# Patient Record
Sex: MALE | Employment: FULL TIME | URBAN - METROPOLITAN AREA
[De-identification: names, ages, dates, MRNs, and addresses within clinical notes are randomized per-mention and may not be internally consistent; named-entity substitution may affect disease eponyms.]

---
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
## 2019-10-09 ENCOUNTER — OFFICE VISIT (OUTPATIENT)
Dept: URGENT CARE | Facility: URGENT CARE | Age: 19
End: 2019-10-09
Payer: COMMERCIAL

## 2019-10-09 VITALS
DIASTOLIC BLOOD PRESSURE: 78 MMHG | SYSTOLIC BLOOD PRESSURE: 129 MMHG | HEART RATE: 90 BPM | WEIGHT: 284.8 LBS | TEMPERATURE: 98.5 F | OXYGEN SATURATION: 97 %

## 2019-10-09 DIAGNOSIS — M22.41 CHONDROMALACIA OF PATELLA, RIGHT: Primary | ICD-10-CM

## 2019-10-09 PROCEDURE — 99213 OFFICE O/P EST LOW 20 MIN: CPT | Performed by: INTERNAL MEDICINE

## 2019-10-09 RX ORDER — NAPROXEN 500 MG/1
500 TABLET ORAL 2 TIMES DAILY WITH MEALS
Qty: 60 TABLET | Refills: 1 | Status: SHIPPED | OUTPATIENT
Start: 2019-10-09

## 2019-10-10 NOTE — PROGRESS NOTES
"SUBJECTIVE:  Jerome Zamora, a 19 year old male, presents for evaluation of knee pain.  He notes the onset of right knee pain earlier today while he was   \"casually working\".  He then crouched/squatted to pick something up when the right knee gave him a shooting pain.  Now both knees are generally sore.  He denies any extension block.  He is going up and down stairs but has more pain going up.      OBJECTIVE:  /78 (BP Location: Right arm, Patient Position: Sitting, Cuff Size: Adult Large)   Pulse 90   Temp 98.5  F (36.9  C)   Wt 129.2 kg (284 lb 12.8 oz)   SpO2 97%   GEN: obese male  RIGHT KNEE: tender over the patella; nontender at the MCL, LCL, medial and lateral joint line; negative Makenna's, negative anterior drawer, mild patellar apprehension  LEFT KNEE: tender over the patella; nontender at the MCL, LCL, medial and lateral joint line; negative Makenna's, negative anterior drawer, negative patellar apprehension    ASSESSMENT/PLAN:    ICD-10-CM    1. 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       Kirill Philip MD   "

## 2019-10-10 NOTE — PATIENT INSTRUCTIONS
Patient Education     Common Kneecap (Patella) Problems  If the kneecap is  off track  even slightly (a tracking problem), it can cause uneven pressure on the back of the kneecap. This can cause pain and difficulty with movements, such as walking and going down stairs. Below are some common causes of kneecap pain.    Cartilage damage  Sometimes the cartilage on the back of the kneecap or in the groove of the thighbone is damaged. Damaged cartilage can t spread pressure evenly. Uneven pressure wears down the cartilage even further.   Dislocation  Sometimes a muscle or ligament in the knee is pulled the wrong way. Or the kneecap may be pushed too hard. Then the kneecap may move partly out of the groove (subluxation). It may even move completely out (dislocation).     Patellar tendinitis  Patellar tendinitis ( jumper s knee ) happens when the quadriceps muscles are overused or tight. During movement, the patellar tendon absorbs more shock than usual. The tendon becomes irritated or damaged.   Plica syndrome  Plica bands are tissue fibers that some people have near the kneecap. They usually cause no problems. But sometimes they can become irritated or inflamed.   Date Last Reviewed: 5/1/2018 2000-2018 [employer]. [address] [place] [address], [place] [address]. All rights reserved. This information is not intended as a substitute for professional medical care. Always follow your healthcare professional's instructions.

## 2020-02-17 ENCOUNTER — HEALTH MAINTENANCE LETTER (OUTPATIENT)
Age: 20
End: 2020-02-17

## 2020-08-10 ENCOUNTER — OFFICE VISIT (OUTPATIENT)
Dept: INTERNAL MEDICINE | Facility: CLINIC | Age: 20
End: 2020-08-10
Payer: COMMERCIAL

## 2020-08-10 VITALS
BODY MASS INDEX: 44.9 KG/M2 | HEART RATE: 79 BPM | DIASTOLIC BLOOD PRESSURE: 80 MMHG | TEMPERATURE: 98.3 F | RESPIRATION RATE: 16 BRPM | WEIGHT: 286.1 LBS | OXYGEN SATURATION: 98 % | HEIGHT: 67 IN | SYSTOLIC BLOOD PRESSURE: 115 MMHG

## 2020-08-10 DIAGNOSIS — E66.01 MORBID OBESITY (H): ICD-10-CM

## 2020-08-10 DIAGNOSIS — F33.1 MODERATE EPISODE OF RECURRENT MAJOR DEPRESSIVE DISORDER (H): Primary | ICD-10-CM

## 2020-08-10 PROCEDURE — 96127 BRIEF EMOTIONAL/BEHAV ASSMT: CPT | Performed by: INTERNAL MEDICINE

## 2020-08-10 PROCEDURE — 99213 OFFICE O/P EST LOW 20 MIN: CPT | Performed by: INTERNAL MEDICINE

## 2020-08-10 RX ORDER — MIRTAZAPINE 15 MG/1
15 TABLET, FILM COATED ORAL AT BEDTIME
COMMUNITY
Start: 2020-07-30 | End: 2020-08-18

## 2020-08-10 RX ORDER — HYDROXYZINE PAMOATE 50 MG/1
50 CAPSULE ORAL 2 TIMES DAILY
COMMUNITY
Start: 2020-07-30

## 2020-08-10 ASSESSMENT — ANXIETY QUESTIONNAIRES
IF YOU CHECKED OFF ANY PROBLEMS ON THIS QUESTIONNAIRE, HOW DIFFICULT HAVE THESE PROBLEMS MADE IT FOR YOU TO DO YOUR WORK, TAKE CARE OF THINGS AT HOME, OR GET ALONG WITH OTHER PEOPLE: SOMEWHAT DIFFICULT
3. WORRYING TOO MUCH ABOUT DIFFERENT THINGS: MORE THAN HALF THE DAYS
7. FEELING AFRAID AS IF SOMETHING AWFUL MIGHT HAPPEN: NEARLY EVERY DAY
GAD7 TOTAL SCORE: 14
1. FEELING NERVOUS, ANXIOUS, OR ON EDGE: MORE THAN HALF THE DAYS
5. BEING SO RESTLESS THAT IT IS HARD TO SIT STILL: SEVERAL DAYS
2. NOT BEING ABLE TO STOP OR CONTROL WORRYING: MORE THAN HALF THE DAYS
6. BECOMING EASILY ANNOYED OR IRRITABLE: NEARLY EVERY DAY

## 2020-08-10 ASSESSMENT — MIFFLIN-ST. JEOR: SCORE: 2261.37

## 2020-08-10 ASSESSMENT — PATIENT HEALTH QUESTIONNAIRE - PHQ9
5. POOR APPETITE OR OVEREATING: SEVERAL DAYS
SUM OF ALL RESPONSES TO PHQ QUESTIONS 1-9: 14

## 2020-08-10 NOTE — PATIENT INSTRUCTIONS
"These were instructions from your hospitalization:  \"You have been referred to the [facility]. Please call them at [phone] if you have not heard from them in the next few days after you are discharged.\"    Please contact the above number to try to arrange an appointment.    "

## 2020-08-10 NOTE — PROGRESS NOTES
"Subjective     Jerome Zamora is a 20 year old male who presents to clinic today for the following health issues:    HPI         Hospital Follow-up Visit:    Hospital/Nursing Home/IP Rehab Facility: [facility]  Date of Admission: 07/27/2020  Date of Discharge: 07/30/2020  Reason(s) for Admission: Suicidal ideation, PTSD      Was your hospitalization related to COVID-19? No   Problems taking medications regularly:  None  Medication changes since discharge: hydroxyzine, mirtazapine, and naproxen  Problems adhering to non-medication therapy:  None    Summary of hospitalization:  CareEverywhere information obtained and reviewed  Diagnostic Tests/Treatments reviewed.  Follow up needed: Psych follow-up  Other Healthcare Providers Involved in Patient s Care:         None  Update since discharge: improved.       Post Discharge Medication Reconciliation: discharge medications reconciled, continue medications without change.  Plan of care communicated with patient                Was given referral to intensive outpatient program which he has not made appointment with as of yet.      Reviewed and updated as needed this visit by Provider         Review of Systems   Constitutional, HEENT, cardiovascular, pulmonary, GI, , musculoskeletal, neuro, skin, endocrine and psych systems are negative, except as otherwise noted.      Objective    /80 (BP Location: Left arm, Patient Position: Chair, Cuff Size: Adult Large)   Pulse 79   Temp 98.3  F (36.8  C) (Oral)   Resp 16   Ht 1.702 m (5' 7\")   Wt 129.8 kg (286 lb 1.6 oz)   SpO2 98%   BMI 44.81 kg/m    Body mass index is 44.81 kg/m .  Physical Exam   GENERAL: healthy, alert and no distress  NECK: no adenopathy, no asymmetry, masses, or scars and thyroid normal to palpation  RESP: lungs clear to auscultation - no rales, rhonchi or wheezes  CV: regular rate and rhythm, normal S1 S2, no S3 or S4, no murmur, click or rub, no peripheral edema and peripheral pulses " "strong  ABDOMEN: soft, nontender, no hepatosplenomegaly, no masses and bowel sounds normal  MS: no gross musculoskeletal defects noted, no edema            Assessment & Plan     1. Moderate episode of recurrent major depressive disorder (H)  Needs to follow-up with intensive outpatient program that he has been referred to.  Given contact information.    2. Morbid obesity (H)  Reiterated the importance of dietary modification and weight loss     BMI:   Estimated body mass index is 44.81 kg/m  as calculated from the following:    Height as of this encounter: 1.702 m (5' 7\").    Weight as of this encounter: 129.8 kg (286 lb 1.6 oz).   Weight management plan: Discussed healthy diet and exercise guidelines        See Patient Instructions    No follow-ups on file.    Graeme Bardales MD  [facility]    "

## 2020-08-11 ASSESSMENT — ANXIETY QUESTIONNAIRES: GAD7 TOTAL SCORE: 14

## 2020-08-18 ENCOUNTER — VIRTUAL VISIT (OUTPATIENT)
Dept: INTERNAL MEDICINE | Facility: CLINIC | Age: 20
End: 2020-08-18
Payer: COMMERCIAL

## 2020-08-18 DIAGNOSIS — F33.2 SEVERE EPISODE OF RECURRENT MAJOR DEPRESSIVE DISORDER, WITHOUT PSYCHOTIC FEATURES (H): Primary | ICD-10-CM

## 2020-08-18 PROCEDURE — 99214 OFFICE O/P EST MOD 30 MIN: CPT | Mod: 95 | Performed by: INTERNAL MEDICINE

## 2020-08-18 RX ORDER — MIRTAZAPINE 30 MG/1
30 TABLET, FILM COATED ORAL AT BEDTIME
Qty: 30 TABLET | Refills: 0 | Status: SHIPPED | OUTPATIENT
Start: 2020-08-18

## 2020-08-18 NOTE — PROGRESS NOTES
"Jerome Zamora is a 20 year old male who is being evaluated via a billable video visit.      The patient has been notified of following:     \"This video visit will be conducted via a call between you and your physician/provider. We have found that certain health care needs can be provided without the need for an in-person physical exam.  This service lets us provide the care you need with a video conversation.  If a prescription is necessary we can send it directly to your pharmacy.  If lab work is needed we can place an order for that and you can then stop by our lab to have the test done at a later time.    Video visits are billed at different rates depending on your insurance coverage.  Please reach out to your insurance provider with any questions.    If during the course of the call the physician/provider feels a video visit is not appropriate, you will not be charged for this service.\"    Patient has given verbal consent for Video visit? Yes  How would you like to obtain your AVS? MyChart  If you are dropped from the video visit, the video invite should be resent to: Text to cell phone: [phone]  Will anyone else be joining your video visit? No    Subjective     Jerome Zamora is a 20 year old male who presents today via video visit for the following health issues:    HPI    Patient was hospitalized at [facility] this summer with severe depression without psychosis and anxiety.  Question of PTSD also noted.  Discharged by [facility] recommended follow-up with primary care for intensive outpatient program.  He that time had no follow-up mental health set up at discharge but was referred here.  Seen once by Dr. Bardales at which time patient was given directions to schedule his IOP at [facility].  He states he called them but was told he had no referral and may would not schedule until the referral was processed.  I believe that we were under the impression that the referral was placed by " "[facility] as he was directed there by them.  Since then he has not seen anybody in follow-up.  He states that the mirtazapine seems to help him sleep but during the day he does get angry and anxious at times with the hydroxyzine being only partially effective.  He does not recall any of the crisis numbers provided to him at his discharge from [facility] has been in contact with any mental health therapist or providers.     Video Start Time: 319    Reviewed and updated as needed this visit by Provider         Review of Systems   Constitutional, HEENT, cardiovascular, pulmonary, gi and gu systems are negative, except as otherwise noted.      Objective           Vitals:  No vitals were obtained today due to virtual visit.    Physical Exam     GENERAL: alert, no distress and over weight  PSYCH: mentation appears normal and appearance disheveled    none         Assessment & Plan     1. Severe episode of recurrent major depressive disorder, without psychotic features (H)  Placed referral for day program through [facility] he also should have psychiatric follow-up.  We recommend involvement of mental health specialist.  Increase his mirtazapine to 30 mg to help with his symptoms at present  Crisis numbers provided on [facility]  - MENTAL HEALTH REFERRAL  - Adult; Psychiatry, Outpatient Treatment; Day Treatment/Dual Disorder/Partial Hospitalization Program - Assess & Treat; [facility]: Day Treatment (MH / Dual / 55+) [phone]; We will contact you to schedule the appointment or...  - mirtazapine (REMERON) 30 MG tablet; Take 1 tablet (30 mg) by mouth At Bedtime  Dispense: 30 tablet; Refill: 0     BMI:   Estimated body mass index is 44.81 kg/m  as calculated from the following:    Height as of 8/10/20: 1.702 m (5' 7\").    Weight as of 8/10/20: 129.8 kg (286 lb 1.6 oz).     See Patient Instructions    No follow-ups on file.    Edmundo Hilario MD  [facility]      Video-Visit Details    Type of " service:  Video Visit    Video End Time:339    Originating Location (pt. Location): Home    Distant Location (provider location):  [facility]     Platform used for Video Visit: Coleman

## 2020-08-18 NOTE — PATIENT INSTRUCTIONS
1. [employer]  [address], [address], [place], [place] [id number] [place] [address] ([employer]) improves the lives of children and adults with mental illnesses and their families by providing free classes on mental illnesses and support groups for adults with mental illnesses, parents and family members. For more information:  Phone: [phone]  Toll free: 2-034-USVQ-ePatientFinder  Website: www.[facility]org    2. Online go to: www.[place]Help.info    Crisis Line Numbers    [facility] ([facility]) [phone]

## 2020-09-28 PROBLEM — E66.01 MORBID OBESITY (H): Status: ACTIVE | Noted: 2020-09-28

## 2020-11-29 ENCOUNTER — HEALTH MAINTENANCE LETTER (OUTPATIENT)
Age: 20
End: 2020-11-29

## 2021-04-06 ENCOUNTER — OFFICE VISIT (OUTPATIENT)
Dept: URGENT CARE | Facility: URGENT CARE | Age: 21
End: 2021-04-06
Payer: COMMERCIAL

## 2021-04-06 VITALS
BODY MASS INDEX: 37.59 KG/M2 | DIASTOLIC BLOOD PRESSURE: 66 MMHG | RESPIRATION RATE: 20 BRPM | TEMPERATURE: 98.7 F | OXYGEN SATURATION: 96 % | WEIGHT: 240 LBS | SYSTOLIC BLOOD PRESSURE: 126 MMHG | HEART RATE: 77 BPM

## 2021-04-06 DIAGNOSIS — H66.001 ACUTE SUPPURATIVE OTITIS MEDIA OF RIGHT EAR WITHOUT SPONTANEOUS RUPTURE OF TYMPANIC MEMBRANE, RECURRENCE NOT SPECIFIED: Primary | ICD-10-CM

## 2021-04-06 DIAGNOSIS — H60.391 INFECTIVE OTITIS EXTERNA, RIGHT: ICD-10-CM

## 2021-04-06 PROCEDURE — 99213 OFFICE O/P EST LOW 20 MIN: CPT | Performed by: FAMILY MEDICINE

## 2021-04-06 RX ORDER — AMOXICILLIN 875 MG
875 TABLET ORAL 2 TIMES DAILY
Qty: 20 TABLET | Refills: 0 | Status: SHIPPED | OUTPATIENT
Start: 2021-04-06 | End: 2021-04-16

## 2021-04-06 RX ORDER — NEOMYCIN SULFATE, POLYMYXIN B SULFATE AND HYDROCORTISONE 10; 3.5; 1 MG/ML; MG/ML; [USP'U]/ML
3 SUSPENSION/ DROPS AURICULAR (OTIC) 4 TIMES DAILY
Qty: 6 ML | Refills: 0 | Status: SHIPPED | OUTPATIENT
Start: 2021-04-06 | End: 2021-04-16

## 2021-04-08 ENCOUNTER — APPOINTMENT (OUTPATIENT)
Dept: CT IMAGING | Facility: CLINIC | Age: 21
End: 2021-04-08
Attending: EMERGENCY MEDICINE
Payer: COMMERCIAL

## 2021-04-08 ENCOUNTER — OFFICE VISIT (OUTPATIENT)
Dept: URGENT CARE | Facility: URGENT CARE | Age: 21
End: 2021-04-08
Payer: COMMERCIAL

## 2021-04-08 ENCOUNTER — HOSPITAL ENCOUNTER (EMERGENCY)
Facility: CLINIC | Age: 21
Discharge: HOME | End: 2021-04-08
Attending: EMERGENCY MEDICINE | Admitting: EMERGENCY MEDICINE
Payer: COMMERCIAL

## 2021-04-08 VITALS
OXYGEN SATURATION: 100 % | HEART RATE: 69 BPM | RESPIRATION RATE: 16 BRPM | SYSTOLIC BLOOD PRESSURE: 139 MMHG | TEMPERATURE: 98.6 F | DIASTOLIC BLOOD PRESSURE: 80 MMHG | BODY MASS INDEX: 37.59 KG/M2 | WEIGHT: 240 LBS

## 2021-04-08 VITALS
HEIGHT: 71 IN | RESPIRATION RATE: 11 BRPM | BODY MASS INDEX: 33.6 KG/M2 | SYSTOLIC BLOOD PRESSURE: 139 MMHG | TEMPERATURE: 98.4 F | HEART RATE: 65 BPM | WEIGHT: 240 LBS | DIASTOLIC BLOOD PRESSURE: 94 MMHG | OXYGEN SATURATION: 98 %

## 2021-04-08 DIAGNOSIS — H61.23 BILATERAL IMPACTED CERUMEN: ICD-10-CM

## 2021-04-08 DIAGNOSIS — H60.391 INFECTIVE OTITIS EXTERNA, RIGHT: ICD-10-CM

## 2021-04-08 DIAGNOSIS — H92.01 RIGHT EAR PAIN: ICD-10-CM

## 2021-04-08 DIAGNOSIS — R22.1 SWOLLEN NECK: Primary | ICD-10-CM

## 2021-04-08 DIAGNOSIS — H60.501 ACUTE OTITIS EXTERNA OF RIGHT EAR, UNSPECIFIED TYPE: ICD-10-CM

## 2021-04-08 LAB
ANION GAP SERPL CALCULATED.3IONS-SCNC: 6 MMOL/L (ref 3–14)
BASOPHILS # BLD AUTO: 0 10E9/L (ref 0–0.2)
BASOPHILS NFR BLD AUTO: 0.2 %
BUN SERPL-MCNC: 15 MG/DL (ref 7–30)
CALCIUM SERPL-MCNC: 9.5 MG/DL (ref 8.5–10.1)
CHLORIDE SERPL-SCNC: 107 MMOL/L (ref 94–109)
CO2 SERPL-SCNC: 22 MMOL/L (ref 20–32)
CREAT SERPL-MCNC: 0.94 MG/DL (ref 0.66–1.25)
CRP SERPL-MCNC: 26.4 MG/L (ref 0–8)
DIFFERENTIAL METHOD BLD: ABNORMAL
EOSINOPHIL # BLD AUTO: 0.3 10E9/L (ref 0–0.7)
EOSINOPHIL NFR BLD AUTO: 2.6 %
ERYTHROCYTE [DISTWIDTH] IN BLOOD BY AUTOMATED COUNT: 13.8 % (ref 10–15)
ERYTHROCYTE [SEDIMENTATION RATE] IN BLOOD BY WESTERGREN METHOD: 8 MM/H (ref 0–15)
GFR SERPL CREATININE-BSD FRML MDRD: >90 ML/MIN/{1.73_M2}
GLUCOSE SERPL-MCNC: 88 MG/DL (ref 70–99)
HCT VFR BLD AUTO: 48.8 % (ref 40–53)
HGB BLD-MCNC: 15.7 G/DL (ref 13.3–17.7)
IMM GRANULOCYTES # BLD: 0.1 10E9/L (ref 0–0.4)
IMM GRANULOCYTES NFR BLD: 0.4 %
LYMPHOCYTES # BLD AUTO: 2.4 10E9/L (ref 0.8–5.3)
LYMPHOCYTES NFR BLD AUTO: 19.7 %
MCH RBC QN AUTO: 26.4 PG (ref 26.5–33)
MCHC RBC AUTO-ENTMCNC: 32.2 G/DL (ref 31.5–36.5)
MCV RBC AUTO: 82 FL (ref 78–100)
MONOCYTES # BLD AUTO: 0.8 10E9/L (ref 0–1.3)
MONOCYTES NFR BLD AUTO: 6.4 %
NEUTROPHILS # BLD AUTO: 8.5 10E9/L (ref 1.6–8.3)
NEUTROPHILS NFR BLD AUTO: 70.7 %
NRBC # BLD AUTO: 0 10*3/UL
NRBC BLD AUTO-RTO: 0 /100
PLATELET # BLD AUTO: 293 10E9/L (ref 150–450)
POTASSIUM SERPL-SCNC: 3.8 MMOL/L (ref 3.4–5.3)
RBC # BLD AUTO: 5.94 10E12/L (ref 4.4–5.9)
SODIUM SERPL-SCNC: 135 MMOL/L (ref 133–144)
WBC # BLD AUTO: 12.1 10E9/L (ref 4–11)

## 2021-04-08 PROCEDURE — 250N000009 HC RX 250: Performed by: EMERGENCY MEDICINE

## 2021-04-08 PROCEDURE — 96375 TX/PRO/DX INJ NEW DRUG ADDON: CPT

## 2021-04-08 PROCEDURE — 250N000013 HC RX MED GY IP 250 OP 250 PS 637: Performed by: EMERGENCY MEDICINE

## 2021-04-08 PROCEDURE — 85025 COMPLETE CBC W/AUTO DIFF WBC: CPT | Performed by: EMERGENCY MEDICINE

## 2021-04-08 PROCEDURE — 99285 EMERGENCY DEPT VISIT HI MDM: CPT | Mod: 25

## 2021-04-08 PROCEDURE — 99215 OFFICE O/P EST HI 40 MIN: CPT | Performed by: NURSE PRACTITIONER

## 2021-04-08 PROCEDURE — 85652 RBC SED RATE AUTOMATED: CPT | Performed by: EMERGENCY MEDICINE

## 2021-04-08 PROCEDURE — 69210 REMOVE IMPACTED EAR WAX UNI: CPT | Mod: RT,LT

## 2021-04-08 PROCEDURE — 80048 BASIC METABOLIC PNL TOTAL CA: CPT | Performed by: EMERGENCY MEDICINE

## 2021-04-08 PROCEDURE — 86140 C-REACTIVE PROTEIN: CPT | Performed by: EMERGENCY MEDICINE

## 2021-04-08 PROCEDURE — 96374 THER/PROPH/DIAG INJ IV PUSH: CPT | Mod: 59

## 2021-04-08 PROCEDURE — 250N000011 HC RX IP 250 OP 636: Performed by: EMERGENCY MEDICINE

## 2021-04-08 PROCEDURE — 70481 CT ORBIT/EAR/FOSSA W/DYE: CPT

## 2021-04-08 RX ORDER — IOPAMIDOL 755 MG/ML
50 INJECTION, SOLUTION INTRAVASCULAR ONCE
Status: COMPLETED | OUTPATIENT
Start: 2021-04-08 | End: 2021-04-08

## 2021-04-08 RX ORDER — OXYCODONE AND ACETAMINOPHEN 5; 325 MG/1; MG/1
1-2 TABLET ORAL EVERY 6 HOURS PRN
Qty: 6 TABLET | Refills: 0 | Status: SHIPPED | OUTPATIENT
Start: 2021-04-08

## 2021-04-08 RX ORDER — OXYCODONE HYDROCHLORIDE 5 MG/1
10 TABLET ORAL ONCE
Status: COMPLETED | OUTPATIENT
Start: 2021-04-08 | End: 2021-04-08

## 2021-04-08 RX ORDER — MORPHINE SULFATE 4 MG/ML
4 INJECTION, SOLUTION INTRAMUSCULAR; INTRAVENOUS ONCE
Status: COMPLETED | OUTPATIENT
Start: 2021-04-08 | End: 2021-04-08

## 2021-04-08 RX ORDER — CIPROFLOXACIN AND DEXAMETHASONE 3; 1 MG/ML; MG/ML
4 SUSPENSION/ DROPS AURICULAR (OTIC) ONCE
Status: DISCONTINUED | OUTPATIENT
Start: 2021-04-08 | End: 2021-04-08

## 2021-04-08 RX ORDER — CIPROFLOXACIN 500 MG/1
500 TABLET, FILM COATED ORAL 2 TIMES DAILY
Qty: 13 TABLET | Refills: 0 | Status: SHIPPED | OUTPATIENT
Start: 2021-04-08 | End: 2021-04-15

## 2021-04-08 RX ORDER — OFLOXACIN 3 MG/ML
10 SOLUTION AURICULAR (OTIC) DAILY
Qty: 5 ML | Refills: 0 | Status: SHIPPED | OUTPATIENT
Start: 2021-04-08 | End: 2021-04-15

## 2021-04-08 RX ORDER — CIPROFLOXACIN 500 MG/1
500 TABLET, FILM COATED ORAL ONCE
Status: COMPLETED | OUTPATIENT
Start: 2021-04-08 | End: 2021-04-08

## 2021-04-08 RX ORDER — KETOROLAC TROMETHAMINE 15 MG/ML
15 INJECTION, SOLUTION INTRAMUSCULAR; INTRAVENOUS ONCE
Status: COMPLETED | OUTPATIENT
Start: 2021-04-08 | End: 2021-04-08

## 2021-04-08 RX ORDER — ACETAMINOPHEN 500 MG
1000 TABLET ORAL ONCE
Status: COMPLETED | OUTPATIENT
Start: 2021-04-08 | End: 2021-04-08

## 2021-04-08 RX ADMIN — KETOROLAC TROMETHAMINE 15 MG: 15 INJECTION, SOLUTION INTRAMUSCULAR; INTRAVENOUS at 14:43

## 2021-04-08 RX ADMIN — CIPROFLOXACIN HYDROCHLORIDE 500 MG: 500 TABLET, FILM COATED ORAL at 14:14

## 2021-04-08 RX ADMIN — MORPHINE SULFATE 4 MG: 4 INJECTION, SOLUTION INTRAMUSCULAR; INTRAVENOUS at 14:43

## 2021-04-08 RX ADMIN — SODIUM CHLORIDE 60 ML: 9 INJECTION, SOLUTION INTRAVENOUS at 15:01

## 2021-04-08 RX ADMIN — ACETAMINOPHEN 1000 MG: 500 TABLET, FILM COATED ORAL at 14:14

## 2021-04-08 RX ADMIN — IOPAMIDOL 50 ML: 755 INJECTION, SOLUTION INTRAVENOUS at 15:01

## 2021-04-08 RX ADMIN — OXYCODONE HYDROCHLORIDE 10 MG: 5 TABLET ORAL at 14:14

## 2021-04-08 ASSESSMENT — ENCOUNTER SYMPTOMS
FACIAL SWELLING: 1
FEVER: 0
HEADACHES: 1

## 2021-04-08 ASSESSMENT — MIFFLIN-ST. JEOR: SCORE: 2110.76

## 2021-04-08 NOTE — ED PROVIDER NOTES
History   Chief Complaint:  Otalgia     The history is provided by the patient.     Jerome Zamora is a 21 year old male with a history of recurrent otitis externa who presents for evaluation of right sided otalgia. The patient awoke two days ago with right sided ear pain. He presented to Urgent Care two days ago and was prescribed amoxicillin and neomycin-polymyxin drops. Since, however, he has had worsening right ear pain and has begun to have right sided facial swelling, decreased hearing, and difficulty closing his jaw. He has been using ibuprofen for pain control. Given that his pain has actually worsened since onset despite taking his medications as prescribed, he again presents to  today who ultimately referred him to the ED for further evaluation. Currently, his pain is a 9/10 in severity. He has some right sided back jaw pain and a headache, though no fever, or ear drainage. He has never previously been seen by ENT.     Upon re-evaluation by Dr. Peterson, the patient states he has episodes of this pain 3-4 times a year.     Review of Systems   Constitutional: Negative for fever.   HENT: Positive for dental problem (pain), ear pain, facial swelling (right side) and hearing loss. Negative for ear discharge.    Neurological: Positive for headaches.   All other systems reviewed and are negative.    Allergies:  No Known Drug Allergies      Medications:   Amoxicillin   Atarax  Remeron   Naproxen  Neomycin-polymyxin HC ear drops    Medical History:   Obesity    Recurrent otitis externa   Depression  PTSD  Cannabis use disorder    Surgical History:  Appendectomy    Social History:  The patient was accompanied to the ED by his mother.  He is currently employed.   PCP: Saida Wesley    Smoking Status: Current - 1-2 cigarettes per day  Alcohol Use: No  Drug Use: No       Physical Exam     Patient Vitals for the past 24 hrs:   BP Temp Temp src Pulse Resp SpO2 Height Weight   04/08/21 1600 -- -- -- -- -- 98 % --  "--   04/08/21 1450 (!) 139/94 -- -- 65 -- 99 % -- --   04/08/21 1226 (!) 162/67 98.4  F (36.9  C) Oral 87 11 97 % 1.803 m (5' 11\") 108.9 kg (240 lb)        Physical Exam  General: Alert and cooperative with exam. Patient in mild to moderate distress. Normal mentation.  Head:  Scalp is NC/AT.  No significant facial swelling  Eyes:  No scleral icterus, PERRL  ENT:  The external nose and ears are normal. The oropharynx is normal and without erythema; mucus membranes are moist. Uvula midline, no evidence of deep space infection.  Otitis externa right ear with tenderness on manipulation; bilateral cerumen impaction  Neck:  Normal range of motion without rigidity.  CV:  Regular rate  Resp:  Non-labored, no retractions or accessory muscle use  GI:  Nondistended  MS:  No lower extremity edema   Skin:  Warm and dry, No rash or lesions noted.  Neuro: Oriented x 3. No gross motor deficits.        Emergency Department Course   Imaging:  CT Temporal Bones w/ Contrast:  1. Soft tissue thickening of the superior aspect of the right external   auditory canal consistent with the patient's history of otitis   externa.   2. Extension of inflammatory debris/inflammation into the epitympanum   on the right. While there is no bony erosion to suggest cholesteatoma,   cholesteatoma cannot be completely excluded.   3. Otherwise, normal right temporal bone.   4. Normal left temporal bone.     Imaging independently reviewed and agree with radiologist interpretation.        Laboratory:  CBC: WBC 12.1 (H),     BMP: AWNL (Creatinine 0.94)     CRP: 26.4 (H)    Erythrocyte sedimentation rate: 8    Procedures:  Cerumen Disimpaction Procedure Note:     Procedure:  Cerumen disimpaction    Indications: Otalgia, cerumen impaction bilateral    Physician: Jose Carlos Peterson DO    Consent:  Informed verbal consent.    Procedure note:   Using direct visualization the cerumen was slowly removed piecemeal from the ear.  Left cerumen " impaction was completely removed with visualize normal TM.  Right-sided cerumen impaction was only partially able to be cleared secondary to right otitis externa though patient noted significant improvement in hearing and decreased pain.    Emergency Department Course:    Reviewed:  I reviewed the patient's nursing notes, vitals, past medical records, Care Everywhere.     Assessments:  1340 Dr. Verma evaluated the patient  1557 Dr. Peterson evaluated the patient.   1630 Dr. Peterson rechecked the patient and discussed plan of care.    Interventions:  1405: Cipro 500 mg PO  1414: Roxicodone 10 mg PO  1414: Tylenol 1000 mg PO  1443: Morphine 4 mg IV  1443: Toradol 15 mg IV    Disposition:  The patient was discharged to home.     Impression & Plan     Medical Decision Making:  Patient is a 21-year-old male presents with several day history of right-sided ear pain and swelling; recently diagnosed with otitis externa and is currently on amoxicillin orally as well as neomycin/polymyxin otic drops.  Patient's medical history and records were reviewed.  The patient was initially evaluated by my partner  who initiated lab and imaging work-up as noted above and provided the patient with 500mg of oral Cipro as well as pain control as above.  He obtained a head CT to rule out mastoiditis and signed the patient out to me for further evaluation.      On my evaluation patient has no evidence of deep space infection.  He does have bilateral cerumen impaction and disimpaction was performed with noted improvement in hearing and pain.  Ear wick was placed for demonstrated otitis externa.  CT imaging shows soft tissue thickening in the superior aspect of the right external auditory canal consistent with otitis externa without evidence of mastoiditis; see complete results above.  Labs were notable for the elevated white count (12.1) and mild elevation of CRP.  At this time, given failure of previously prescribed otic drops, will  transition patient to ofloxacin drops.  Given moderate to severe otitis externa we will additionally continue patient on oral Cipro and have him follow-up closely with ENT especially given recurrent otitis externa.  Provided short prescription for Percocet for breakthrough pain.  Return precautions discussed.  Patient discharged home.    Diagnosis:     ICD-10-CM    1. Infective otitis externa, right  H60.391 CBC with platelets differential     Basic metabolic panel     CRP inflammation     Erythrocyte sedimentation rate auto   2. Bilateral impacted cerumen  H61.23         Discharge Medications:  Discharge Medication List as of 4/8/2021  4:36 PM      START taking these medications    Details   ciprofloxacin (CIPRO) 500 MG tablet Take 1 tablet (500 mg) by mouth 2 times daily for 7 days, Disp-13 tablet, R-0, Local Print      ofloxacin (FLOXIN) 0.3 % otic solution Place 10 drops into the right ear daily for 7 days, Disp-5 mL, R-0, Local Print      oxyCODONE-acetaminophen (PERCOCET) 5-325 MG tablet Take 1-2 tablets by mouth every 6 hours as needed for moderate to severe pain, Disp-6 tablet, R-0, Local Print             Scribe Disclosure:  I, Salina Ware, am serving as a scribe at 1:37 PM on 4/8/2021 to document services personally performed by Jose Carlos Peterson DO based on my observations and the provider's statements to me.      Jose Carlos Peterson DO  04/08/21 3633

## 2021-04-08 NOTE — DISCHARGE INSTRUCTIONS
Use ofloxacin drops once daily for the next 7 days as prescribed  Take oral ciprofloxacin twice daily for the next 7 days as prescribed  Discontinue amoxicillin  Tylenol and/or ibuprofen for pain control.  For breakthrough pain substitute oxycodone for Tylenol as discussed

## 2021-04-08 NOTE — PROGRESS NOTES
Chief Complaint   Patient presents with     Ear Problem     Right Ear pain X 2 days. Pt was here 2 days ago and given medication but it has gotten worse         ICD-10-CM    1. Swollen neck  R22.1    2. Right ear pain  H92.01    3. Acute otitis externa of right ear, unspecified type  H60.501    Called and spoke with Iglesia at [facility] emergency room to inform of patient's arrival.  Explained concerns about possible abscess with patient's declining condition and increasing pain despite antibiotics.    >40 minutes spent on the date of the encounter doing chart review, history and exam, documentation and further activities per the note    Medical Decision Making    Differential Diagnosis:  URI Adult/Peds:  Acute right otitis media, Acute left otitis media, Epiglotitis, Laryngitis, Mononucleosis, Otitis externa and Peritonsillar abscess, mastoiditis, soft tissue abscess, parotitis    Subjective     Jerome Zamora is an 21 year old male who presents to clinic today for increasing pain in the right ear jaw and neck.  He was seen 2 days ago in the urgent care and treated with Polytrim eardrops and amoxicillin orally.  After 48 hours on antibiotics he continues to worsen and not improve.  He reports chills but no fever.  He is accompanied by his mother    ROS: 10 point ROS neg other than the symptoms noted above in the HPI.       Objective    /80   Pulse 69   Temp 98.6  F (37  C) (Tympanic)   Resp 16   Wt 108.9 kg (240 lb)   SpO2 100%   BMI 37.59 kg/m      Physical Exam         GENERAL APPEARANCE: alert, severe distress and morbidly obese     EYES: PERRL, EOMI, sclera non-icteric     HENT: Left ear canal and tympanic membrane are normal, right ear canal is swollen and cerumen is obstructing the right tympanic membrane.  Facial and neck swelling is present on the right but no obvious erythema, pharynx is red        RESP: lungs clear to auscultation - no rales, rhonchi or wheezes     CV: regular rates  and rhythm, no murmurs, rubs, or gallop     MS: extremities normal- no gross deformities noted; normal muscle tone.     SKIN: no suspicious lesions or rashes     NEURO: Normal strength and tone, mentation intact and speech normal     PSYCH: normal thought process; no significant mood disturbance    Patient Instructions   Go to the emergency room for further evaluation.      TONYA Fisher, CNP  [facility] Provider

## 2021-04-10 ENCOUNTER — HEALTH MAINTENANCE LETTER (OUTPATIENT)
Age: 21
End: 2021-04-10

## 2021-04-25 NOTE — PROGRESS NOTES
SUBJECTIVE:Jerome Zamora is a 21 year old male who presents with right ear painfor day(s).   Severity: moderate   Timing:worsening  Additional symptoms include none.      History reviewed. No pertinent past medical history.  No Known Allergies  Social History     Tobacco Use     Smoking status: Former Smoker     Packs/day: 0.00     Smokeless tobacco: Never Used     Tobacco comment: mom Quit   Substance Use Topics     Alcohol use: No       ROS: CONSTITUTIONAL:NEGATIVE for fever, chills, change in weight    OBJECTIVE:  /66   Pulse 77   Temp 98.7  F (37.1  C)   Resp 20   Wt 108.9 kg (240 lb)   SpO2 96%   BMI 37.59 kg/m     The right TM is erythematous     The right auditory canal is erythematous, swollen, tender  The left TM is normal: no effusions, no erythema, and normal landmarks  The left auditory canal is normal and without drainage, edema or erythema  Oropharynx exam is normal: no lesions, erythema, adenopathy or exudate.GENERAL: no acute distress  EYES: EOMI,  PERRL, conjunctiva clear  SKIN: no suspicious lesions or rashes       ICD-10-CM    1. Acute suppurative otitis media of right ear without spontaneous rupture of tympanic membrane, recurrence not specified  H66.001 amoxicillin (AMOXIL) 875 MG tablet   2. Infective otitis externa, right  H60.391 neomycin-polymyxin-hydrocortisone (CORTISPORIN) 3.5-47975-9 otic suspension       F/U PCP/IM/FP/UC if worse, not any better

## 2021-09-25 ENCOUNTER — HEALTH MAINTENANCE LETTER (OUTPATIENT)
Age: 21
End: 2021-09-25

## 2022-05-07 ENCOUNTER — HEALTH MAINTENANCE LETTER (OUTPATIENT)
Age: 22
End: 2022-05-07

## 2022-12-26 ENCOUNTER — HEALTH MAINTENANCE LETTER (OUTPATIENT)
Age: 22
End: 2022-12-26

## 2023-06-02 ENCOUNTER — HEALTH MAINTENANCE LETTER (OUTPATIENT)
Age: 23
End: 2023-06-02